# Patient Record
Sex: FEMALE
[De-identification: names, ages, dates, MRNs, and addresses within clinical notes are randomized per-mention and may not be internally consistent; named-entity substitution may affect disease eponyms.]

---

## 2021-09-03 PROBLEM — Z00.00 ENCOUNTER FOR PREVENTIVE HEALTH EXAMINATION: Status: ACTIVE | Noted: 2021-09-03

## 2021-09-27 ENCOUNTER — APPOINTMENT (OUTPATIENT)
Dept: ORTHOPEDIC SURGERY | Facility: CLINIC | Age: 71
End: 2021-09-27

## 2021-09-30 ENCOUNTER — ESTABLISHED COMPREHENSIVE EXAM (OUTPATIENT)
Dept: URBAN - METROPOLITAN AREA CLINIC 92 | Facility: CLINIC | Age: 71
End: 2021-09-30

## 2021-09-30 DIAGNOSIS — H25.13: ICD-10-CM

## 2021-09-30 DIAGNOSIS — H35.371: ICD-10-CM

## 2021-09-30 PROCEDURE — 92015 DETERMINE REFRACTIVE STATE: CPT

## 2021-09-30 PROCEDURE — 92134 CPTRZ OPH DX IMG PST SGM RTA: CPT

## 2021-09-30 PROCEDURE — 92014 COMPRE OPH EXAM EST PT 1/>: CPT

## 2021-09-30 ASSESSMENT — TONOMETRY
OD_IOP_MMHG: 20
OS_IOP_MMHG: 18

## 2021-09-30 ASSESSMENT — KERATOMETRY
OS_K2POWER_DIOPTERS: 44.50
OS_K1POWER_DIOPTERS: 43.75
OD_K2POWER_DIOPTERS: 44.00
OD_AXISANGLE_DEGREES: 162
OS_AXISANGLE_DEGREES: 161
OS_AXISANGLE2_DEGREES: 71
OD_AXISANGLE2_DEGREES: 72
OD_K1POWER_DIOPTERS: 43.75

## 2021-09-30 ASSESSMENT — VISUAL ACUITY
OS_SC: 20/40
OD_SC: 20/40

## 2021-10-21 ENCOUNTER — APPOINTMENT (OUTPATIENT)
Dept: ORTHOPEDIC SURGERY | Facility: CLINIC | Age: 71
End: 2021-10-21
Payer: MEDICARE

## 2021-10-21 VITALS
HEIGHT: 67 IN | DIASTOLIC BLOOD PRESSURE: 75 MMHG | SYSTOLIC BLOOD PRESSURE: 119 MMHG | WEIGHT: 180 LBS | BODY MASS INDEX: 28.25 KG/M2 | HEART RATE: 86 BPM

## 2021-10-21 DIAGNOSIS — M17.11 UNILATERAL PRIMARY OSTEOARTHRITIS, RIGHT KNEE: ICD-10-CM

## 2021-10-21 DIAGNOSIS — Z78.9 OTHER SPECIFIED HEALTH STATUS: ICD-10-CM

## 2021-10-21 DIAGNOSIS — M17.10 UNILATERAL PRIMARY OSTEOARTHRITIS, UNSPECIFIED KNEE: ICD-10-CM

## 2021-10-21 DIAGNOSIS — Z86.39 PERSONAL HISTORY OF OTHER ENDOCRINE, NUTRITIONAL AND METABOLIC DISEASE: ICD-10-CM

## 2021-10-21 DIAGNOSIS — M23.203 DERANGEMENT OF UNSPECIFIED MEDIAL MENISCUS DUE TO OLD TEAR OR INJURY, RIGHT KNEE: ICD-10-CM

## 2021-10-21 DIAGNOSIS — Z86.79 PERSONAL HISTORY OF OTHER DISEASES OF THE CIRCULATORY SYSTEM: ICD-10-CM

## 2021-10-21 PROCEDURE — 20610 DRAIN/INJ JOINT/BURSA W/O US: CPT | Mod: RT

## 2021-10-21 PROCEDURE — 99203 OFFICE O/P NEW LOW 30 MIN: CPT | Mod: 25

## 2021-10-21 RX ORDER — ROSUVASTATIN CALCIUM 5 MG/1
5 TABLET, FILM COATED ORAL
Refills: 0 | Status: ACTIVE | COMMUNITY

## 2021-10-21 RX ORDER — DILTIAZEM HYDROCHLORIDE 300 MG/1
300 CAPSULE, EXTENDED RELEASE ORAL
Refills: 0 | Status: ACTIVE | COMMUNITY

## 2021-10-21 RX ORDER — METFORMIN HYDROCHLORIDE 500 MG/1
500 TABLET, COATED ORAL
Refills: 0 | Status: ACTIVE | COMMUNITY

## 2021-10-21 RX ORDER — OLMESARTAN MEDOXOMIL 40 MG/1
TABLET, FILM COATED ORAL
Refills: 0 | Status: ACTIVE | COMMUNITY

## 2021-10-21 RX ORDER — ESOMEPRAZOLE MAGNESIUM 20 MG/1
20 CAPSULE, DELAYED RELEASE ORAL
Refills: 0 | Status: ACTIVE | COMMUNITY

## 2021-10-21 NOTE — PHYSICAL EXAM
[LE] : Sensory: Intact in bilateral lower extremities [Normal RLE] : Right Lower Extremity: No scars, rashes, lesions, ulcers, skin intact [Normal LLE] : Left Lower Extremity: No scars, rashes, lesions, ulcers, skin intact [Normal Touch] : sensation intact for touch [Normal] : Alert and in no acute distress [de-identified] : Knees \par Mildly antalgic gait.\par No significant effusion.  No ecchymoses or erythema.\par Tender medial joint line of the right knee.\par Mild pain medial knee with Jaylin.\par IA Lachman.  Normal anterior and posterior drawer and normal varus and valgus laxity.\par Intact extensor mechanism.\par Knee range of motion is from 0-1 125 to 130 degrees flexion with pain on full flexion\par Normal neurovascular exam distally [de-identified] : \par Reviewed images both x-rays and MRIs that she brought in with her today.\par X-rays of the knees bilateral weightbearing 4 views taken in September 9, 2021 shows mild medial joint space narrowing with small osteophytes and slight degeneration lateral and patellofemoral compartments of the right knee.  Mild degenerative changes hip joints.\par \par MRI was done of the right knee on August 30, 2021 showing osteophytes medial compartment and moderate cartilage thinning in the medial compartment and a complex degenerative tear of the mid body of the medial meniscus with mild extrusion.  Lateral compartment is without significant cartilage loss.  Patellofemoral joint is with high-grade cartilage loss medial patellar facet.

## 2021-10-21 NOTE — ASSESSMENT
[FreeTextEntry1] : 70 yo woman presents with pain in her right knee over the last several months.  She had some partial relief from the second steroid injection that had been done.  She still having moderate pain which I think is mostly related to the osteoarthritis although there is a degenerative meniscus tear medially which may contribute to some of the pain.  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  Heat and ice as needed.  Tylenol or NSAIDs as needed.  If she does not tolerate the oral NSAIDs she can use the Voltaren gel.\par Follow-up in 4 to 6 weeks to check and see how she is doing.

## 2021-10-21 NOTE — HISTORY OF PRESENT ILLNESS
[de-identified] : 70 yo woman presents for pain in her right knee that started August 2021 when she was in [place] on vacation and suddenly had pain without any significant injury.\par Pain was severe at first about a 9 out of 10.  She saw a couple different doctors.  Her PCP did a cortisone injection medial knee which caused severe pain hours later.  She then saw another doctor who does pain management and they aspirated fluid.  A culture was negative.  A cortisone injection was done and her pain decreased after that down to about a 5 out of 10.  She has been applying Voltaren gel.  She has not done any physical therapy.  Her pain is worse standing too long and walking and better with rest and putting her feet up.  NSAIDs can aggravate her stomach so she has been using topical ointment.

## 2021-10-21 NOTE — PROCEDURE
[Injection] : Injection [Right] : of the right [Knee Joint] : knee joint [Osteoarthritis] : Osteoarthritis [Patient] : patient [Risk] : risk [Benefits] : benefits [Alternatives] : alternatives [Bleeding] : bleeding [Infection] : infection [Allergic Reaction] : allergic reaction [Verbal Consent Obtained] : verbal consent was obtained prior to the procedure [Alcohol] : Alcohol [Betadine] : Betadine [Ethyl Chloride Spray] : ethyl chloride spray was used as a topical anesthetic [Lateral] : lateral [20] : a 20-gauge [Bandage Applied] : a bandage [Tolerated Well] : The patient tolerated the procedure well [None] : none [No Strenuous Activity___day(s)] : avoid strenuous activity for [unfilled] day(s) [___ Week(s)] : in [unfilled] week(s) [FreeTextEntry8] : Monovisc 4ml, lot [id number], exp 9/30/23

## 2022-03-30 ENCOUNTER — FOLLOW UP (OUTPATIENT)
Dept: URBAN - METROPOLITAN AREA CLINIC 92 | Facility: CLINIC | Age: 72
End: 2022-03-30

## 2022-03-30 DIAGNOSIS — H35.371: ICD-10-CM

## 2022-03-30 DIAGNOSIS — H25.13: ICD-10-CM

## 2022-03-30 PROCEDURE — 92012 INTRM OPH EXAM EST PATIENT: CPT

## 2022-03-30 ASSESSMENT — KERATOMETRY
OS_AXISANGLE2_DEGREES: 71
OS_K1POWER_DIOPTERS: 43.75
OD_AXISANGLE_DEGREES: 162
OD_AXISANGLE2_DEGREES: 72
OD_K2POWER_DIOPTERS: 44.00
OD_K1POWER_DIOPTERS: 43.75
OS_K2POWER_DIOPTERS: 44.50
OS_AXISANGLE_DEGREES: 161

## 2022-03-30 ASSESSMENT — VISUAL ACUITY
OS_SC: 20/40
OD_SC: 20/30

## 2022-03-30 ASSESSMENT — TONOMETRY
OS_IOP_MMHG: 19
OD_IOP_MMHG: 19

## 2022-12-13 ASSESSMENT — KERATOMETRY
OS_AXISANGLE2_DEGREES: 71
OS_K2POWER_DIOPTERS: 44.50
OD_K1POWER_DIOPTERS: 43.75
OS_AXISANGLE_DEGREES: 161
OD_AXISANGLE2_DEGREES: 72
OD_K2POWER_DIOPTERS: 44.00
OS_K1POWER_DIOPTERS: 43.75
OD_AXISANGLE_DEGREES: 162

## 2022-12-14 ENCOUNTER — ESTABLISHED COMPREHENSIVE EXAM (OUTPATIENT)
Dept: URBAN - METROPOLITAN AREA CLINIC 92 | Facility: CLINIC | Age: 72
End: 2022-12-14

## 2022-12-14 DIAGNOSIS — E11.9: ICD-10-CM

## 2022-12-14 DIAGNOSIS — H35.371: ICD-10-CM

## 2022-12-14 DIAGNOSIS — H16.223: ICD-10-CM

## 2022-12-14 DIAGNOSIS — H25.13: ICD-10-CM

## 2022-12-14 DIAGNOSIS — H02.886: ICD-10-CM

## 2022-12-14 DIAGNOSIS — H02.883: ICD-10-CM

## 2022-12-14 PROCEDURE — 92014 COMPRE OPH EXAM EST PT 1/>: CPT

## 2022-12-14 PROCEDURE — 92134 CPTRZ OPH DX IMG PST SGM RTA: CPT

## 2022-12-14 PROCEDURE — 92015 DETERMINE REFRACTIVE STATE: CPT

## 2022-12-14 ASSESSMENT — VISUAL ACUITY
OS_CC: 20/40
OD_CC: 20/30

## 2022-12-14 ASSESSMENT — TONOMETRY
OS_IOP_MMHG: 19
OD_IOP_MMHG: 20

## 2023-10-05 ENCOUNTER — ESTABLISHED COMPREHENSIVE EXAM (OUTPATIENT)
Dept: URBAN - METROPOLITAN AREA CLINIC 92 | Facility: CLINIC | Age: 73
End: 2023-10-05

## 2023-10-05 DIAGNOSIS — H16.223: ICD-10-CM

## 2023-10-05 DIAGNOSIS — H02.883: ICD-10-CM

## 2023-10-05 DIAGNOSIS — H25.13: ICD-10-CM

## 2023-10-05 DIAGNOSIS — E11.9: ICD-10-CM

## 2023-10-05 DIAGNOSIS — H35.371: ICD-10-CM

## 2023-10-05 DIAGNOSIS — H02.886: ICD-10-CM

## 2023-10-05 PROCEDURE — 92014 COMPRE OPH EXAM EST PT 1/>: CPT

## 2023-10-05 PROCEDURE — 92015 DETERMINE REFRACTIVE STATE: CPT

## 2023-10-05 PROCEDURE — 92134 CPTRZ OPH DX IMG PST SGM RTA: CPT

## 2023-10-05 ASSESSMENT — KERATOMETRY
OD_K2POWER_DIOPTERS: 44.00
OS_K2POWER_DIOPTERS: 44.50
OD_AXISANGLE_DEGREES: 162
OD_AXISANGLE2_DEGREES: 50
OS_AXISANGLE_DEGREES: 175
OS_AXISANGLE2_DEGREES: 85
OD_AXISANGLE_DEGREES: 140
OD_K2POWER_DIOPTERS: 44.25
OS_K1POWER_DIOPTERS: 43.75
OD_AXISANGLE2_DEGREES: 72
OS_AXISANGLE2_DEGREES: 71
OD_K1POWER_DIOPTERS: 43.75
OS_AXISANGLE_DEGREES: 161

## 2023-10-05 ASSESSMENT — TONOMETRY
OS_IOP_MMHG: 15
OD_IOP_MMHG: 15

## 2023-10-05 ASSESSMENT — VISUAL ACUITY
OD_CC: 20/50-1
OS_CC: 20/70-2

## 2024-12-16 ENCOUNTER — ESTABLISHED COMPREHENSIVE EXAM (OUTPATIENT)
Dept: URBAN - METROPOLITAN AREA CLINIC 92 | Facility: CLINIC | Age: 74
End: 2024-12-16

## 2024-12-16 DIAGNOSIS — H25.13: ICD-10-CM

## 2024-12-16 DIAGNOSIS — H02.886: ICD-10-CM

## 2024-12-16 DIAGNOSIS — H02.883: ICD-10-CM

## 2024-12-16 DIAGNOSIS — E11.9: ICD-10-CM

## 2024-12-16 DIAGNOSIS — H35.371: ICD-10-CM

## 2024-12-16 DIAGNOSIS — H16.223: ICD-10-CM

## 2024-12-16 PROCEDURE — 92014 COMPRE OPH EXAM EST PT 1/>: CPT

## 2024-12-16 PROCEDURE — 92015 DETERMINE REFRACTIVE STATE: CPT

## 2024-12-16 PROCEDURE — 92250 FUNDUS PHOTOGRAPHY W/I&R: CPT

## 2024-12-16 ASSESSMENT — VISUAL ACUITY
OS_CC: 20/30
OD_CC: 20/20

## 2024-12-16 ASSESSMENT — TONOMETRY
OS_IOP_MMHG: 15
OD_IOP_MMHG: 14

## 2024-12-16 ASSESSMENT — KERATOMETRY
OS_K1POWER_DIOPTERS: 43.75
OS_K2POWER_DIOPTERS: 44.25
OD_K2POWER_DIOPTERS: 44.50
OD_K1POWER_DIOPTERS: 43.75
OD_AXISANGLE2_DEGREES: 61
OS_AXISANGLE_DEGREES: 172
OD_AXISANGLE_DEGREES: 151
OS_AXISANGLE2_DEGREES: 82